# Patient Record
Sex: FEMALE
[De-identification: names, ages, dates, MRNs, and addresses within clinical notes are randomized per-mention and may not be internally consistent; named-entity substitution may affect disease eponyms.]

---

## 2017-08-09 NOTE — MAMMOGRAPHY REPORT
Screening mammogram:



Routine views demonstrate a heterogeneously dense fibroglandular 

pattern with a generally symmetric distribution. In the right MLO 

projection there are couple of questionable significant asymmetries one 

of which is superior and the other in the central breast. Neither can 

be clearly distinguished in the CC l projections. The findings are not 

otherwise remarkable.



CAD used.



Impression:



Right breast asymmetries.



Recommendation:



This patient's prior exams are being requested for comparison before 

final recommendation.



BI-RADS CATEGORY:  0 = Needs additional imaging evaluation



ACR BI-RADS MAMMOGRAPHIC CODES:

0 = Needs additional imaging evaluation; 1 = Negative; 2 = Benign; 3 = 

Probably benign; 4 = Suspicious; 5 = Malignant; 6 = Known biopsy-proven 

malignancy



COMMENT:

      1.   Dense breast tissue, i.e., adenosis, fibrocystic    

            changes, etc., may obscure an underlying neoplasm.

      2.   Approximately 10% of cancers are not detected with

            mammography.

      3.   A negative mammography report should not delay biopsy 

            if a clinically suspicious mass is present.

## 2019-10-07 ENCOUNTER — HOSPITAL ENCOUNTER (OUTPATIENT)
Dept: HOSPITAL 5 - SPVWC | Age: 49
Discharge: HOME | End: 2019-10-07
Attending: OBSTETRICS & GYNECOLOGY
Payer: COMMERCIAL

## 2019-10-07 DIAGNOSIS — Z12.31: Primary | ICD-10-CM

## 2019-10-07 PROCEDURE — 77067 SCR MAMMO BI INCL CAD: CPT

## 2019-10-07 PROCEDURE — 77063 BREAST TOMOSYNTHESIS BI: CPT

## 2019-10-09 NOTE — MAMMOGRAPHY REPORT
DIGITAL SCREENING MAMMOGRAM WITH TOMOSYNTHESIS WITH CAD, 10/7/2019



INDICATION: Routine Screening Mammography. SCREENING WITH ABBY



TECHNIQUE:  Digital bilateral  2D and 3D mammography with tomosynthesis was obtained in the craniocau
anika and mediolateral oblique projections.  Computer-Aided Detection (CAD) analysis was used for inter
pretation of this study.



COMPARISON: 8/9/2017



FINDINGS: 



Breast Density: The breasts are heterogeneously dense, which may obscure small masses.



There is no evidence of dominant mass, suspicious calcifications or architectural distortion in eithe
r breast.





IMPRESSION: No mammographic evidence of malignancy.



Follow up recommendation: Routine yearly



BI-RADS Category 1:  Negative.



A "normal" or negative report should not discourage follow up or biopsy of a clinically significant f
inding.



A written summary of these findings will be mailed to the patient. The patient will be entered into a
 mammography reporting system which will generate a reminder letter for the patient's next appointmen
t at the appropriate interval.



The [employer] recommends yearly mammograms starting at age 40 and continuing as l
sami as a woman is in good health.  Breast MRI is recommended for women with an approximate 20-25% or 
greater lifetime risk of breast cancer, including women with a strong family history of breast or ova
judson cancer or who have been treated for Hodgkin's disease.



Signer Name: Manish Prince MD 

Signed: 10/9/2019 8:27 AM

 Workstation Name: [id number]

## 2019-10-09 NOTE — MAMMOGRAPHY REPORT
DIGITAL SCREENING MAMMOGRAM WITH TOMOSYNTHESIS WITH CAD, 10/7/2019



INDICATION: Routine Screening Mammography. SCREENING WITH ABBY



TECHNIQUE:  Digital bilateral  2D and 3D mammography with tomosynthesis was obtained in the craniocau
anika and mediolateral oblique projections.  Computer-Aided Detection (CAD) analysis was used for inter
pretation of this study.



COMPARISON: 8/9/2017



FINDINGS: 



Breast Density: The breasts are heterogeneously dense, which may obscure small masses.



There is no evidence of dominant mass, suspicious calcifications or architectural distortion in eithe
r breast.





IMPRESSION: No mammographic evidence of malignancy.



Follow up recommendation: Routine yearly



BI-RADS Category 1:  Negative.



A "normal" or negative report should not discourage follow up or biopsy of a clinically significant f
inding.



A written summary of these findings will be mailed to the patient. The patient will be entered into a
 mammography reporting system which will generate a reminder letter for the patient's next appointmen
t at the appropriate interval.



The [employer] recommends yearly mammograms starting at age 40 and continuing as l
sami as a woman is in good health.  Breast MRI is recommended for women with an approximate 20-25% or 
greater lifetime risk of breast cancer, including women with a strong family history of breast or ova
judson cancer or who have been treated for Hodgkin's disease.



Signer Name: Manish Prince MD 

Signed: 10/9/2019 8:27 AM

 Workstation Name: [id number]

## 2020-11-23 ENCOUNTER — HOSPITAL ENCOUNTER (OUTPATIENT)
Dept: HOSPITAL 5 - SPVWC | Age: 50
Discharge: HOME | End: 2020-11-23
Attending: OBSTETRICS & GYNECOLOGY
Payer: COMMERCIAL

## 2020-11-23 DIAGNOSIS — Z12.31: Primary | ICD-10-CM

## 2020-11-23 DIAGNOSIS — N63.20: ICD-10-CM

## 2020-11-23 DIAGNOSIS — N63.10: ICD-10-CM

## 2020-11-23 PROCEDURE — 77067 SCR MAMMO BI INCL CAD: CPT

## 2020-11-23 PROCEDURE — 77063 BREAST TOMOSYNTHESIS BI: CPT

## 2020-11-23 NOTE — MAMMOGRAPHY REPORT
DIGITAL SCREENING MAMMOGRAM WITH TOMOSYNTHESIS WITH CAD, 11/23/2020



CLINICAL INFORMATION / INDICATION: Routine Screening Mammography. 



TECHNIQUE:  Digital bilateral 2D and 3D mammography with tomosynthesis was obtained in the craniocaud
al and mediolateral oblique projections.  Computer-Aided Detection (CAD) analysis was used for interp
retation of this study.



COMPARISON: 10/7/2019



FINDINGS: 



Breast Density: The breasts are heterogeneously dense, which may obscure small masses.



No dominant mass, suspicious calcifications, or architectural distortion in either breast. 



Stable bilateral nodular densities.

 

IMPRESSION: No mammographic evidence of malignancy.



Follow up recommendation: Routine yearly



BI-RADS Category 2:  Benign.





-------------------------------------------------------------------------------------------

A "normal" or negative report should not discourage follow up or biopsy of a clinically significant f
inding.



A written summary of these findings will be mailed to the patient. The patient will be entered into a
 mammography reporting system which will generate a reminder letter for the patient's next appointmen
t at the appropriate interval.



The [employer] recommends yearly mammograms starting at age 40 and continuing as l
sami as a woman is in good health.  Breast MRI is recommended for women with an approximate 20-25% or 
greater lifetime risk of breast cancer, including women with a strong family history of breast or ova
judson cancer or who have been treated for Hodgkin's disease.



Signer Name: Oliver Peace MD 

Signed: 11/23/2020 5:52 PM

Workstation Name: [id number]

## 2020-11-23 NOTE — MAMMOGRAPHY REPORT
DIGITAL SCREENING MAMMOGRAM WITH TOMOSYNTHESIS WITH CAD, 11/23/2020



CLINICAL INFORMATION / INDICATION: Routine Screening Mammography. 



TECHNIQUE:  Digital bilateral 2D and 3D mammography with tomosynthesis was obtained in the craniocaud
al and mediolateral oblique projections.  Computer-Aided Detection (CAD) analysis was used for interp
retation of this study.



COMPARISON: 10/7/2019



FINDINGS: 



Breast Density: The breasts are heterogeneously dense, which may obscure small masses.



No dominant mass, suspicious calcifications, or architectural distortion in either breast. 



Stable bilateral nodular densities.

 

IMPRESSION: No mammographic evidence of malignancy.



Follow up recommendation: Routine yearly



BI-RADS Category 2:  Benign.





-------------------------------------------------------------------------------------------

A "normal" or negative report should not discourage follow up or biopsy of a clinically significant f
inding.



A written summary of these findings will be mailed to the patient. The patient will be entered into a
 mammography reporting system which will generate a reminder letter for the patient's next appointmen
t at the appropriate interval.



The [employer] recommends yearly mammograms starting at age 40 and continuing as l
sami as a woman is in good health.  Breast MRI is recommended for women with an approximate 20-25% or 
greater lifetime risk of breast cancer, including women with a strong family history of breast or ova
judson cancer or who have been treated for Hodgkin's disease.



Signer Name: Oliver Peace MD 

Signed: 11/23/2020 5:52 PM

Workstation Name: [id number]

## 2021-12-08 ENCOUNTER — HOSPITAL ENCOUNTER (OUTPATIENT)
Dept: HOSPITAL 5 - SPVWC | Age: 51
Discharge: HOME | End: 2021-12-08
Attending: OBSTETRICS & GYNECOLOGY
Payer: COMMERCIAL

## 2021-12-08 DIAGNOSIS — Z12.31: Primary | ICD-10-CM

## 2021-12-08 PROCEDURE — 77067 SCR MAMMO BI INCL CAD: CPT

## 2021-12-08 PROCEDURE — 77063 BREAST TOMOSYNTHESIS BI: CPT

## 2021-12-10 NOTE — MAMMOGRAPHY REPORT
DIGITAL SCREENING MAMMOGRAM WITH TOMOSYNTHESIS WITH CAD, 12/9/2021



CLINICAL INFORMATION / INDICATION: Routine Screening Mammography. 



TECHNIQUE:  Digital bilateral 2D and 3D mammography with tomosynthesis was obtained in the craniocaud
al and mediolateral oblique projections.  Computer-Aided Detection (CAD) analysis was used for interp
retation of this study.



COMPARISON: 11/23/2020, 10/7/2019



FINDINGS: 



Breast Density: The breasts are heterogeneously dense, which may obscure small masses.



No dominant mass, suspicious calcifications, or architectural distortion in either breast. 



No interval change.

 

IMPRESSION: No mammographic evidence of malignancy.



Follow up recommendation: Routine yearly



BI-RADS Category 1:  Negative.





-------------------------------------------------------------------------------------------

A "normal" or negative report should not discourage follow up or biopsy of a clinically significant f
inding.



A written summary of these findings will be mailed to the patient. The patient will be entered into a
 mammography reporting system which will generate a reminder letter for the patient's next appointmen
t at the appropriate interval.



The [employer] recommends yearly mammograms starting at age 40 and continuing as l
sami as a woman is in good health.  Breast MRI is recommended for women with an approximate 20-25% or 
greater lifetime risk of breast cancer, including women with a strong family history of breast or ova
judson cancer or who have been treated for Hodgkin's disease.



Signer Name: Dleores Gonzalez MD 

Signed: 12/10/2021 4:14 PM

Workstation Name: [id number]

## 2021-12-10 NOTE — MAMMOGRAPHY REPORT
DIGITAL SCREENING MAMMOGRAM WITH TOMOSYNTHESIS WITH CAD, 12/9/2021



CLINICAL INFORMATION / INDICATION: Routine Screening Mammography. 



TECHNIQUE:  Digital bilateral 2D and 3D mammography with tomosynthesis was obtained in the craniocaud
al and mediolateral oblique projections.  Computer-Aided Detection (CAD) analysis was used for interp
retation of this study.



COMPARISON: 11/23/2020, 10/7/2019



FINDINGS: 



Breast Density: The breasts are heterogeneously dense, which may obscure small masses.



No dominant mass, suspicious calcifications, or architectural distortion in either breast. 



No interval change.

 

IMPRESSION: No mammographic evidence of malignancy.



Follow up recommendation: Routine yearly



BI-RADS Category 1:  Negative.





-------------------------------------------------------------------------------------------

A "normal" or negative report should not discourage follow up or biopsy of a clinically significant f
inding.



A written summary of these findings will be mailed to the patient. The patient will be entered into a
 mammography reporting system which will generate a reminder letter for the patient's next appointmen
t at the appropriate interval.



The [employer] recommends yearly mammograms starting at age 40 and continuing as l
sami as a woman is in good health.  Breast MRI is recommended for women with an approximate 20-25% or 
greater lifetime risk of breast cancer, including women with a strong family history of breast or ova
judson cancer or who have been treated for Hodgkin's disease.



Signer Name: Delores Gonzalez MD 

Signed: 12/10/2021 4:14 PM

Workstation Name: [id number]